# Patient Record
Sex: MALE | Race: WHITE | NOT HISPANIC OR LATINO
[De-identification: names, ages, dates, MRNs, and addresses within clinical notes are randomized per-mention and may not be internally consistent; named-entity substitution may affect disease eponyms.]

---

## 2019-05-03 ENCOUNTER — APPOINTMENT (OUTPATIENT)
Dept: INTERNAL MEDICINE | Facility: CLINIC | Age: 24
End: 2019-05-03
Payer: COMMERCIAL

## 2019-05-03 VITALS
TEMPERATURE: 98.2 F | WEIGHT: 165 LBS | BODY MASS INDEX: 26.52 KG/M2 | RESPIRATION RATE: 14 BRPM | OXYGEN SATURATION: 98 % | HEART RATE: 78 BPM | HEIGHT: 66 IN | SYSTOLIC BLOOD PRESSURE: 124 MMHG | DIASTOLIC BLOOD PRESSURE: 78 MMHG

## 2019-05-03 DIAGNOSIS — Z78.9 OTHER SPECIFIED HEALTH STATUS: ICD-10-CM

## 2019-05-03 DIAGNOSIS — F17.200 NICOTINE DEPENDENCE, UNSPECIFIED, UNCOMPLICATED: ICD-10-CM

## 2019-05-03 DIAGNOSIS — Z83.3 FAMILY HISTORY OF DIABETES MELLITUS: ICD-10-CM

## 2019-05-03 PROCEDURE — 36415 COLL VENOUS BLD VENIPUNCTURE: CPT

## 2019-05-03 PROCEDURE — 99204 OFFICE O/P NEW MOD 45 MIN: CPT | Mod: 25

## 2019-05-03 RX ORDER — ESCITALOPRAM OXALATE 5 MG/1
5 TABLET, FILM COATED ORAL DAILY
Refills: 0 | Status: ACTIVE | COMMUNITY

## 2019-05-03 NOTE — HISTORY OF PRESENT ILLNESS
[FreeTextEntry1] : Going to [facility] for ALcoholism \par mostly beer 9 per day\par no Cocaine or POT no Valium or Xanax \par sober 6 days \par no tremors \par got DUI

## 2019-05-03 NOTE — HEALTH RISK ASSESSMENT
[Good] : ~his/her~  mood as  good [No falls in past year] : Patient reported no falls in the past year [0] : 1) Little interest or pleasure doing things: Not at all (0) [1] : 2) Feeling down, depressed, or hopeless for several days (1) [] : No

## 2019-05-03 NOTE — PHYSICAL EXAM
[No Acute Distress] : no acute distress [Well-Appearing] : well-appearing [Well Developed] : well developed [Well Nourished] : well nourished [PERRL] : pupils equal round and reactive to light [Normal Sclera/Conjunctiva] : normal sclera/conjunctiva [Normal Voice/Communication] : normal voice/communication [Normal Oropharynx] : the oropharynx was normal [Normal Outer Ear/Nose] : the outer ears and nose were normal in appearance [EOMI] : extraocular movements intact [Supple] : supple [No JVD] : no jugular venous distention [Normal TMs] : both tympanic membranes were normal [Thyroid Normal, No Nodules] : the thyroid was normal and there were no nodules present [No Lymphadenopathy] : no lymphadenopathy [No Respiratory Distress] : no respiratory distress  [Clear to Auscultation] : lungs were clear to auscultation bilaterally [No Accessory Muscle Use] : no accessory muscle use [Normal Rate] : normal rate  [Regular Rhythm] : with a regular rhythm [Normal S1, S2] : normal S1 and S2 [No Murmur] : no murmur heard [No Abdominal Bruit] : a ~M bruit was not heard ~T in the abdomen [No Varicosities] : no varicosities [No Carotid Bruits] : no carotid bruits [No Extremity Clubbing/Cyanosis] : no extremity clubbing/cyanosis [No Edema] : there was no peripheral edema [Pedal Pulses Present] : the pedal pulses are present [No Palpable Aorta] : no palpable aorta [Non Tender] : non-tender [Soft] : abdomen soft [Non-distended] : non-distended [Normal Supraclavicular Nodes] : no supraclavicular lymphadenopathy [Normal Bowel Sounds] : normal bowel sounds [No Masses] : no abdominal mass palpated [No HSM] : no HSM [Normal Anterior Cervical Nodes] : no anterior cervical lymphadenopathy [Normal Posterior Cervical Nodes] : no posterior cervical lymphadenopathy [No Joint Swelling] : no joint swelling [No CVA Tenderness] : no CVA  tenderness [No Spinal Tenderness] : no spinal tenderness [No Rash] : no rash [Normal Gait] : normal gait [Grossly Normal Strength/Tone] : grossly normal strength/tone [Coordination Grossly Intact] : coordination grossly intact [No Focal Deficits] : no focal deficits [Deep Tendon Reflexes (DTR)] : deep tendon reflexes were 2+ and symmetric [Memory Grossly Normal] : memory grossly normal [Normal Affect] : the affect was normal [Alert and Oriented x3] : oriented to person, place, and time [Speech Grossly Normal] : speech grossly normal [Normal Insight/Judgement] : insight and judgment were intact [Normal Mood] : the mood was normal

## 2019-05-04 LAB
ALBUMIN SERPL ELPH-MCNC: 4.7 G/DL
ALP BLD-CCNC: 48 U/L
ALT SERPL-CCNC: 15 U/L
ANION GAP SERPL CALC-SCNC: 9 MMOL/L
AST SERPL-CCNC: 20 U/L
BASOPHILS # BLD AUTO: 0.04 K/UL
BASOPHILS NFR BLD AUTO: 0.7 %
BILIRUB SERPL-MCNC: 0.5 MG/DL
BUN SERPL-MCNC: 12 MG/DL
CALCIUM SERPL-MCNC: 10 MG/DL
CHLORIDE SERPL-SCNC: 99 MMOL/L
CHOLEST SERPL-MCNC: 196 MG/DL
CHOLEST/HDLC SERPL: 3.4 RATIO
CK SERPL-CCNC: 111 U/L
CO2 SERPL-SCNC: 31 MMOL/L
CREAT SERPL-MCNC: 0.92 MG/DL
EOSINOPHIL # BLD AUTO: 0.19 K/UL
EOSINOPHIL NFR BLD AUTO: 3.4 %
ESTIMATED AVERAGE GLUCOSE: 91 MG/DL
GLUCOSE SERPL-MCNC: 73 MG/DL
HBA1C MFR BLD HPLC: 4.8 %
HCT VFR BLD CALC: 47.1 %
HDLC SERPL-MCNC: 57 MG/DL
HGB BLD-MCNC: 15.4 G/DL
IMM GRANULOCYTES NFR BLD AUTO: 0.5 %
LDLC SERPL CALC-MCNC: 80 MG/DL
LYMPHOCYTES # BLD AUTO: 1.19 K/UL
LYMPHOCYTES NFR BLD AUTO: 21.3 %
MAN DIFF?: NORMAL
MCHC RBC-ENTMCNC: 32.3 PG
MCHC RBC-ENTMCNC: 32.7 GM/DL
MCV RBC AUTO: 98.7 FL
MONOCYTES # BLD AUTO: 0.52 K/UL
MONOCYTES NFR BLD AUTO: 9.3 %
NEUTROPHILS # BLD AUTO: 3.63 K/UL
NEUTROPHILS NFR BLD AUTO: 64.8 %
PLATELET # BLD AUTO: 254 K/UL
POTASSIUM SERPL-SCNC: 4.7 MMOL/L
PROT SERPL-MCNC: 6.8 G/DL
RBC # BLD: 4.77 M/UL
RBC # FLD: 11.7 %
SODIUM SERPL-SCNC: 139 MMOL/L
TRIGL SERPL-MCNC: 295 MG/DL
TSH SERPL-ACNC: 0.71 UIU/ML
WBC # FLD AUTO: 5.6 K/UL

## 2019-05-05 DIAGNOSIS — E55.9 VITAMIN D DEFICIENCY, UNSPECIFIED: ICD-10-CM

## 2019-05-06 PROBLEM — E55.9 VITAMIN D DEFICIENCY: Status: ACTIVE | Noted: 2019-05-06

## 2019-05-06 LAB — 25(OH)D3 SERPL-MCNC: 19 NG/ML

## 2019-05-06 RX ORDER — ADHESIVE TAPE 3"X 2.3 YD
50 MCG TAPE, NON-MEDICATED TOPICAL
Qty: 100 | Refills: 1 | Status: ACTIVE | COMMUNITY

## 2019-05-09 LAB
M TB IFN-G BLD-IMP: NEGATIVE
QUANTIFERON TB PLUS MITOGEN MINUS NIL: 9.53 IU/ML
QUANTIFERON TB PLUS NIL: 0.03 IU/ML
QUANTIFERON TB PLUS TB1 MINUS NIL: 0 IU/ML
QUANTIFERON TB PLUS TB2 MINUS NIL: 0 IU/ML

## 2020-05-27 ENCOUNTER — APPOINTMENT (OUTPATIENT)
Dept: INTERNAL MEDICINE | Facility: CLINIC | Age: 25
End: 2020-05-27
Payer: COMMERCIAL

## 2020-05-27 DIAGNOSIS — F10.10 ALCOHOL ABUSE, UNCOMPLICATED: ICD-10-CM

## 2020-05-27 DIAGNOSIS — F32.9 MAJOR DEPRESSIVE DISORDER, SINGLE EPISODE, UNSPECIFIED: ICD-10-CM

## 2020-05-27 DIAGNOSIS — F17.200 NICOTINE DEPENDENCE, UNSPECIFIED, UNCOMPLICATED: ICD-10-CM

## 2020-05-27 PROCEDURE — 99214 OFFICE O/P EST MOD 30 MIN: CPT | Mod: 95

## 2020-05-27 PROCEDURE — 99406 BEHAV CHNG SMOKING 3-10 MIN: CPT | Mod: 25,95

## 2020-05-27 RX ORDER — BUPROPION HYDROCHLORIDE 150 MG/1
150 TABLET, EXTENDED RELEASE ORAL DAILY
Qty: 30 | Refills: 1 | Status: ACTIVE | COMMUNITY

## 2020-05-27 NOTE — HISTORY OF PRESENT ILLNESS
[Home] : at home, [unfilled] , at the time of the visit. [Verbal consent obtained from patient] : the patient, [unfilled] [Medical Office: ([facility])___] : at the medical office located in  [FreeTextEntry1] : relapsed 2 weeks ago \par last drank 10 day ago drinking beer 3 per day 1 pint of Vodka\par no cocaine\par going back to [facility]

## 2020-05-27 NOTE — PHYSICAL EXAM
[No Acute Distress] : no acute distress [Normal Voice/Communication] : normal voice/communication [Memory Grossly Normal] : memory grossly normal [Speech Grossly Normal] : speech grossly normal [Alert and Oriented x3] : oriented to person, place, and time [Normal Affect] : the affect was normal [Normal Mood] : the mood was normal [Normal Insight/Judgement] : insight and judgment were intact

## 2020-05-27 NOTE — HEALTH RISK ASSESSMENT
[] : Yes [4 or more  times a week (4 pts)] : 4 or more  times a week (4 points) [Yes] : Yes [No] : In the past 12 months have you used drugs other than those required for medical reasons? No [7 to 9 (3 pts)] : 7 to 9 (3  points) [No falls in past year] : Patient reported no falls in the past year [0] : 2) Feeling down, depressed, or hopeless: Not at all (0) [de-identified] : 1/4 PPD

## 2020-05-27 NOTE — COUNSELING
[Risk of tobacco use and health benefits of smoking cessation discussed] : Risk of tobacco use and health benefits of smoking cessation discussed [Cessation strategies including cessation program discussed] : Cessation strategies including cessation program discussed [Willing to Quit Smoking] : Willing to quit smoking [Tobacco Use Cessation Intermediate Greater Than 3 Minutes Up to 10 Minutes] : Tobacco Use Cessation Intermediate Greater Than 3 Minutes Up to 10 Minutes [FreeTextEntry1] : 5

## 2020-05-27 NOTE — END OF VISIT
Patient calling, has been taking Prilosec OTC, pt states it is working great. Should she continue to take it? Please call patient at [phone].   [Time Spent: ___ minutes] : I have spent [unfilled] minutes of time on the encounter. [>50% of the face to face encounter time was spent on counseling and/or coordination of care for ___] : Greater than 50% of the face to face encounter time was spent on counseling and/or coordination of care for [unfilled]

## 2020-05-29 LAB
ALBUMIN SERPL ELPH-MCNC: 4.6 G/DL
ALP BLD-CCNC: 59 U/L
ALT SERPL-CCNC: 27 U/L
ANION GAP SERPL CALC-SCNC: 12 MMOL/L
AST SERPL-CCNC: 17 U/L
BASOPHILS # BLD AUTO: 0.03 K/UL
BASOPHILS NFR BLD AUTO: 0.6 %
BILIRUB SERPL-MCNC: 0.4 MG/DL
BUN SERPL-MCNC: 8 MG/DL
CALCIUM SERPL-MCNC: 9.9 MG/DL
CHLORIDE SERPL-SCNC: 100 MMOL/L
CO2 SERPL-SCNC: 27 MMOL/L
CREAT SERPL-MCNC: 1.03 MG/DL
EOSINOPHIL # BLD AUTO: 0.11 K/UL
EOSINOPHIL NFR BLD AUTO: 2.1 %
GLUCOSE SERPL-MCNC: 98 MG/DL
HCT VFR BLD CALC: 46 %
HGB BLD-MCNC: 15.5 G/DL
IMM GRANULOCYTES NFR BLD AUTO: 0.2 %
LYMPHOCYTES # BLD AUTO: 1.26 K/UL
LYMPHOCYTES NFR BLD AUTO: 23.7 %
MAN DIFF?: NORMAL
MCHC RBC-ENTMCNC: 31.5 PG
MCHC RBC-ENTMCNC: 33.7 GM/DL
MCV RBC AUTO: 93.5 FL
MONOCYTES # BLD AUTO: 0.76 K/UL
MONOCYTES NFR BLD AUTO: 14.3 %
NEUTROPHILS # BLD AUTO: 3.14 K/UL
NEUTROPHILS NFR BLD AUTO: 59.1 %
PLATELET # BLD AUTO: 236 K/UL
POTASSIUM SERPL-SCNC: 4.2 MMOL/L
PROT SERPL-MCNC: 6.7 G/DL
RBC # BLD: 4.92 M/UL
RBC # FLD: 12.4 %
SODIUM SERPL-SCNC: 140 MMOL/L
WBC # FLD AUTO: 5.31 K/UL

## 2020-05-31 LAB
M TB IFN-G BLD-IMP: NEGATIVE
QUANTIFERON TB PLUS MITOGEN MINUS NIL: 8.12 IU/ML
QUANTIFERON TB PLUS NIL: 0.01 IU/ML
QUANTIFERON TB PLUS TB1 MINUS NIL: 0 IU/ML
QUANTIFERON TB PLUS TB2 MINUS NIL: 0 IU/ML

## 2021-07-11 ENCOUNTER — EMERGENCY (EMERGENCY)
Facility: HOSPITAL | Age: 26
LOS: 1 days | Discharge: HOME | End: 2021-07-11
Attending: EMERGENCY MEDICINE | Admitting: EMERGENCY MEDICINE
Payer: SELF-PAY

## 2021-07-11 VITALS
OXYGEN SATURATION: 98 % | HEIGHT: 71 IN | DIASTOLIC BLOOD PRESSURE: 83 MMHG | HEART RATE: 100 BPM | TEMPERATURE: 98 F | SYSTOLIC BLOOD PRESSURE: 154 MMHG | RESPIRATION RATE: 20 BRPM | WEIGHT: 160.06 LBS

## 2021-07-11 PROCEDURE — 99281 EMR DPT VST MAYX REQ PHY/QHP: CPT

## 2021-07-11 PROCEDURE — 99284 EMERGENCY DEPT VISIT MOD MDM: CPT

## 2021-07-11 NOTE — ED PROVIDER NOTE - CONSTITUTIONAL, MLM
SUBJECTIVE:  Livia Granger is a 40 year old female who presents to the clinic today for a poison ivy.  Onset of rash was 10 day(s) ago.   Rash is sudden onset, still present and worsening.  Location of the rash: lower legs bilaterally, R antecubital fossa.  Quality/symptoms of rash: itching, burning and blistering   Symptoms are moderately severe and rash seems to be worsening.  Previous history of a similar rash? No  Recent exposure history: clearing brush.   has similar rash, though not as bad.    Associated symptoms include: nothing.    No past medical history on file.  Current Outpatient Medications   Medication Sig Dispense Refill     Ferrous Gluconate-C-Folic Acid (IRON-C PO)        QVAR REDIHALER 80 MCG/ACT inhaler        VENTOLIN  (90 Base) MCG/ACT inhaler        Social History     Tobacco Use     Smoking status: Never Smoker     Smokeless tobacco: Never Used   Substance Use Topics     Alcohol use: Not on file       ROS:  No fevers.    EXAM:   /78   Pulse 83   Temp 98  F (36.7  C) (Tympanic)   SpO2 97%   GENERAL: alert, no acute distress.  SKIN: Rash description:    Distribution: localized  Location: lower legs bilaterally with numerous blisters filled with straw-colored fluid    R forearm with a 3 cm patch of erythema and small blisters    ASSESSMENT:  Poison ivy    PLAN:  Prednisone taper.  Follow up if not noticeably better in 1 week.    Patient Instructions   Take 3 tablets of prednisone daily for 3 days, then    Take 2 tablets of prednisone daily for 3 days, then    Take 1 tablet of prednisone daily for 4 days, then    Take 1/2 tablet of prednisone daily for 4 days.    Then celebrate being done! :)         normal... Well appearing, awake, alert, oriented to person, place, time/situation and in no apparent distress.

## 2021-07-11 NOTE — ED PROVIDER NOTE - NSFOLLOWUPINSTRUCTIONS_ED_ALL_ED_FT
Please call [phone] for assistance in reducing your drinking      Alcohol Use Disorder    AMBULATORY CARE:    Alcohol use disorder (AUD) is problem drinking. AUD includes alcohol abuse and alcohol dependence. Alcohol can damage your brain, heart, kidneys, lungs, and liver. Your risk of stroke is greater if you have 5 or more drinks each day. If you are pregnant, you and your baby are at risk for serious health problems. No amount of alcohol is safe during pregnancy.    Signs and symptoms of AUD: Your healthcare provider will ask you about your alcohol use. Mild AUD means you had 2 to 3 of the following over the last 12 months. Moderate means you have 4 to 5. Severe means you have 6 or more.  •You have tried to decrease or stop drinking more than one time. You are not able to control your drinking habits. You keep going back to drinking even after you quit. You have had more to drink than you planned, or you drank for a longer time than you wanted.      •You crave alcohol. You have a desire to drink more often or to drink larger amounts of alcohol. You have a hard time thinking about anything other than alcohol.      •You put extra effort and time into drinking alcohol. You often go to events or activities that will include drinking. You spend much of your time drinking alcohol or being with people who also drink.      •You spend less time doing more important things. You have trouble taking part in social or daily activities at school, work, or home.      •You have given up activities that you like. You would rather drink. You also spend a lot of time recovering from the effects of drinking.      •You continue to drink even when it causes health or relationship problems. You may have problems with your family, friends, or coworkers but still want to drink. Health problems include liver problems, stomach ulcers, high blood pressure, or a stroke.      •You develop alcohol tolerance. Tolerance means the amount of alcohol you usually drink no longer causes the effects you desire. You need to drink even more alcohol to get the same effects.      •You have withdrawal (physical or mental) symptoms after not drinking for a short period. Alcohol is needed to relieve or prevent withdrawal symptoms such as tremors (shakes). You may also have to drink to stop withdrawal symptoms or to cure a hangover.      •You put yourself in physically dangerous settings while drinking. Examples include driving a car or having unprotected sex after drinking.      Call your local emergency number (216 in the [place]) if:   •You have a seizure.          Call your doctor if:   •Your heart is beating faster than usual.      •You have hallucinations.      •You cannot remember what happens while you are drinking.      •You are anxious and have nausea.      •Your hands are shaky and you are sweating heavily.      •You have questions or concerns about your condition or care.      Treatment: Your healthcare provider may admit you to the hospital to help you withdraw from alcohol safely. Then you may need any of the following:  •Medicines to decrease your craving for alcohol      •Support groups such as Alcoholics Anonymous       •Therapy services from a psychiatrist or psychologist       •Admission to an inpatient facility for treatment for severe dependence      Follow up with your healthcare provider as directed: Write down your questions so you remember to ask them during your visits.    For support and more information:   •Substance Abuse and Mental Health Services Administration  [address]  [place] [address]  Web Address: http://www.[facility].gov      •Alcoholics Anonymous  Web Address: http://www.aa.org  ***    Wound Closure Removal, Care After      This sheet gives you information about how to care for yourself after your stitches (sutures), staples, or skin adhesives have been removed. Your health care provider may also give you more specific instructions. If you have problems or questions, contact your health care provider.      What can I expect after the procedure?  After your sutures or staples have been removed or your skin adhesives have fallen off, it is common to have:  •Some discomfort and swelling in the area.      •Slight redness in the area.        Follow these instructions at home:    If you have a bandage:     •Wash your hands with soap and water before you change your bandage (dressing). If soap and water are not available, use hand .      •Change your dressing as told by your health care provider. If your dressing becomes wet or dirty, or develops a bad smell, change it as soon as possible.       •If your dressing sticks to your skin, pour warm, clean water over it until it loosens and can be removed without pulling apart the wound edges. Pat the area dry with a soft, clean towel. Do not rub the wound because that may cause bleeding.        Wound care      •Keep the wound area dry and clean.    •Check your wound every day for signs of infection. Check for:  •Redness, swelling, or pain.      •Fluid or blood.       •Warmth.       •Pus or a bad smell.        •Wash your hands with soap and water before and after touching your wound.      •Apply cream or ointment only as told by your health care provider. If you are using cream or ointment, wash the area with soap and water 2 times a day to remove all the cream or ointment. Rinse off the soap and pat the area dry with a clean towel.      •If skin glue or adhesive strips were applied after sutures or staples were removed, leave these closures in place until they peel off on their own. If adhesive strip edges start to loosen and curl up, you may trim the loose edges. Do not remove adhesive strips completely unless your health care provider tells you to do that.      •Continue to protect the wound from injury.       • Do not pick at your wound. Picking can cause an infection.      Bathing     • Do not take baths, swim, or use a hot tub until your health care provider approves.      •Ask your health care provider when it is okay to shower.    •Follow these steps for showering:  •If you have a dressing, remove it before getting into the shower.      •In the shower, allow soapy water to get on the wound. Avoid scrubbing the wound.      •When you get out of the shower, dry the wound by patting it with a clean towel.      •Reapply a dressing over the wound if needed.        Scar care   •When your wound has completely healed, take actions to help decrease the size of your scar:  •Wear sunscreen over the scar or cover it with clothing when you are outside. New scars get sunburned easily, which can make scarring worse.      •Gently massage the scarred area. This can decrease scar thickness.        General instructions     •Take over-the-counter and prescription medicines only as told by your health care provider.       •Keep all follow-up visits as told by your health care provider. This is important.        Contact a health care provider if:    •You have redness, swelling, or pain around your wound.       •You have fluid or blood coming from your wound.       •Your wound feels warm to the touch.       •You have pus or a bad smell coming from your wound.       •Your wound opens up.      •You have chills.        Get help right away if:    •You have a fever.      •You have redness that is spreading from your wound.        Summary    •Change your dressing as told by your health care provider. If your dressing becomes wet or dirty, or develops a bad smell, change it as soon as possible.      •Check your wound every day for signs of infection.      •Wash your hands with soap and water before and after touching your wound.      This information is not intended to replace advice given to you by your health care provider. Make sure you discuss any questions you have with your health care provider.
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
## 2021-07-11 NOTE — ED PROVIDER NOTE - PATIENT PORTAL LINK FT
You can access the FollowMyHealth Patient Portal offered by [facility] by registering at the following website: [email or web address]. By joining [employer]’s FollowMyHealth portal, you will also be able to view your health information using other applications (apps) compatible with our system.

## 2021-07-11 NOTE — ED PROVIDER NOTE - NSFOLLOWUPCLINICS_GEN_ALL_ED_FT
Family Practice Clinic  Family Medicine  [address] [place] [id number]  Phone: [phone]  Fax:

## 2024-04-05 ENCOUNTER — OUTPATIENT (OUTPATIENT)
Dept: OUTPATIENT SERVICES | Facility: HOSPITAL | Age: 29
LOS: 1 days | End: 2024-04-05
Payer: COMMERCIAL

## 2024-04-05 DIAGNOSIS — Z00.00 ENCOUNTER FOR GENERAL ADULT MEDICAL EXAMINATION WITHOUT ABNORMAL FINDINGS: ICD-10-CM

## 2024-04-05 DIAGNOSIS — H52.10 MYOPIA, UNSPECIFIED EYE: ICD-10-CM

## 2024-04-05 PROBLEM — F10.10 ALCOHOL ABUSE, UNCOMPLICATED: Chronic | Status: ACTIVE | Noted: 2021-07-11

## 2024-04-05 PROCEDURE — G0463: CPT

## 2025-03-17 ENCOUNTER — EMERGENCY (EMERGENCY)
Facility: HOSPITAL | Age: 30
LOS: 1 days | Discharge: HOME | End: 2025-03-17
Attending: EMERGENCY MEDICINE | Admitting: EMERGENCY MEDICINE
Payer: COMMERCIAL

## 2025-03-17 VITALS
HEART RATE: 99 BPM | TEMPERATURE: 98 F | DIASTOLIC BLOOD PRESSURE: 93 MMHG | WEIGHT: 169.98 LBS | SYSTOLIC BLOOD PRESSURE: 134 MMHG | HEIGHT: 71 IN | OXYGEN SATURATION: 100 % | RESPIRATION RATE: 16 BRPM

## 2025-03-17 PROCEDURE — 99285 EMERGENCY DEPT VISIT HI MDM: CPT

## 2025-03-17 PROCEDURE — 99284 EMERGENCY DEPT VISIT MOD MDM: CPT

## 2025-03-17 NOTE — ED ADULT NURSE NOTE - CHIEF COMPLAINT QUOTE
Pt states he wants to detox from alcohol addiction.  Pt drinks vodka 1pints-1/2 gallon a day.  Pt has been to rehab before.  Pt had seizures in the past.  Last drink 830a beer. Pt states he wants to detox from alcohol addiction.  Pt drinks vodka 1pints-1/2 gallon a day.  Pt has been to rehab before.  Pt had seizures in the past.  Last drink 830a beer.  alcohol withdrawal
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
## 2025-03-17 NOTE — ED ADULT TRIAGE NOTE - CHIEF COMPLAINT QUOTE
Pt states he wants to detox from alcohol addiction.  Pt drinks vodka 1pints-1/2 gallon a day.  Pt has been to rehab before.  Pt had seizures in the past.  Last drink 830a beer.

## 2025-03-17 NOTE — ED PROVIDER NOTE - PATIENT PORTAL LINK FT
You can access the FollowMyHealth Patient Portal offered by [facility] by registering at the following website: [email or web address]. By joining [employer]’s FollowMyHealth portal, you will also be able to view your health information using other applications (apps) compatible with our system.

## 2025-03-17 NOTE — ED ADULT NURSE NOTE - OBJECTIVE STATEMENT
pt axo3, seeking assistance in detox. pt states last drink was today at 0830 am.  Pt drinks vodka 1pints-1/2 gallon a day. no tremors present, gait steady. does not endorse headache, delusions/hallucinations, anxiety, nausea, vomiting, or tactile/auditory disturbances. pt became upset with plan of care (labs/fluids/SW consult) and stated "I do not want to wait. this is not helping me." pt re-evaluated by MD and was discharged. safety maintained.

## 2025-03-17 NOTE — ED ADULT NURSE NOTE - NSFALLUNIVINTERV_ED_ALL_ED
Bed/Stretcher in lowest position, wheels locked, appropriate side rails in place/Call bell, personal items and telephone in reach/Instruct patient to call for assistance before getting out of bed/chair/stretcher/Non-slip footwear applied when patient is off stretcher/Globe to call system/Physically safe environment - no spills, clutter or unnecessary equipment/Purposeful proactive rounding/Room/bathroom lighting operational, light cord in reach

## 2025-03-17 NOTE — ED PROVIDER NOTE - NSFOLLOWUPINSTRUCTIONS_ED_ALL_ED_FT
Alcohol Use Disorder  Alcohol use disorder is a condition in which drinking disrupts daily life. People with this condition drink too much alcohol and cannot control their drinking.    Alcohol use disorder can cause serious problems with physical health. It can affect the brain, heart, and other internal organs. This disorder can raise the risk for certain cancers and cause problems with mental health, such as depression or anxiety.    What are the causes?  This condition is caused by drinking too much alcohol over time. Some people with this condition drink to cope with or escape from negative life events. Others drink to relieve symptoms of physical pain or symptoms of mental illness.    What increases the risk?  You are more likely to develop this condition if:  You have a family history of alcohol use disorder.  Your culture encourages drinking to the point of becoming drunk (intoxication).  You had a mood or conduct disorder in childhood.  You have been abused.  You are an adolescent and you:  Have poor performance in school.  Have poor supervision or guidance.  Act on impulse and like taking risks.  What are the signs or symptoms?  Symptoms of this condition include:  Drinking more than you want to.  Trying several times without success to drink less.  Spending a lot of time thinking about alcohol, getting alcohol, drinking alcohol, or recovering from drinking alcohol.  Continuing to drink even when it is causing serious problems in your daily life.  Drinking when it is dangerous to drink, such as before driving a car.  Needing more and more alcohol to get the same effect you want (building up tolerance).  Having symptoms of withdrawal when you stop drinking. Withdrawal symptoms may include:  Trouble sleeping, leading to tiredness (fatigue).  Mood swings of depression and anxiety.  Physical symptoms, such as a fast heart rate, rapid breathing, high blood pressure (hypertension), fever, cold sweats, or nausea.  Seizures.  Severe confusion.  Feeling or seeing things that are not there (hallucinations).  Shaking movements that you cannot control (tremors).  How is this diagnosed?  This condition is diagnosed with an assessment. Your health care provider may start by asking three or four questions about your drinking, or they may give you a simple test to take. This helps to get clear information from you.    You may also have a physical exam or lab tests. You may be referred to a substance abuse counselor.    How is this treated?  Two people talking with a counselor.  With education, some people with alcohol use disorder are able to reduce their drinking. Many with this disorder cannot change their drinking behavior on their own and need help with treatment from substance use specialists. Treatments may include:  Detoxification. Detoxification involves quitting drinking with supervision and direction of health care providers. Your health care provider may prescribe medicines within the first week to help lessen withdrawal symptoms. Alcohol withdrawal can be dangerous and life-threatening. Detoxification may be provided in a home, community, or primary care setting, or in a hospital or substance use treatment facility.  Counseling. This may involve motivational interviewing (MI), family therapy, or cognitive behavioral therapy (CBT). A counselor can address the things you can do to change your drinking behavior and how to maintain the changes. Talk therapy aims to:  Identify your positive motivations to change.  Identify and avoid the things that trigger your drinking.  Help you learn how to plan your behavior change.  Develop support systems that can help you sustain the change.  Medicines. Medicines can help treat this disorder by:  Decreasing cravings.  Decreasing the positive feeling you have when you drink.  Causing an uncomfortable physical reaction when you drink (aversion therapy).  Support groups such as Alcoholics Anonymous (AA). These groups are led by people who have quit drinking. The groups provide emotional support, advice, and guidance.  Some people with this condition benefit from a combination of treatments provided by specialized substance use treatment centers.    Follow these instructions at home:  A sign telling a person not to drink beer, wine, or hard liquor.   Medicines    Take over-the-counter and prescription medicines only as told by your health care provider.  Ask before starting any new medicines, herbs, or supplements.  General instructions    Ask friends and family members to support your choice to stay sober.  Avoid places where alcohol is served.  Create a plan to deal with tempting situations.  Attend support groups regularly.  Practice hobbies or activities you enjoy.  Do not drink and drive.  How is this prevented?  Do not drink alcohol if your health care provider tells you not to drink.  If you drink alcohol:  Limit how much you have to:  0–1 drink a day for women who are not pregnant.  0–2 drinks a day for men.  Know how much alcohol is in your drink. In the [place] one drink equals one 12 oz bottle of beer (355 mL), one 5 oz glass of wine (148 mL), or one 1½ oz glass of hard liquor (44 mL).  If you have a mental health condition, seek treatment.  Develop a healthy lifestyle through:  Meditation or deep breathing.  Exercise.  Spending time in nature.  Listening to music.  Talking with a trusted friend or family member.  If you are a teen:  Do not drink alcohol. Avoid gatherings where you might be tempted to drink alcohol.  Do not be afraid to say no if someone offers you alcohol. Speak up about why you do not want to drink. Set a positive example for others around you by not drinking.  Build relationships with friends who do not drink.  Where to find more information  Substance Abuse and Mental Health Services Administration: samhsa.gov  Alcoholics Anonymous: aa.org  Contact a health care provider if:  You cannot take your medicines as told.  Your symptoms get worse or you experience symptoms of withdrawal when you stop drinking.  You start drinking again (relapse) and your symptoms get worse.  Get help right away if:  You have thoughts about hurting yourself or others.  Get help right away if you feel like you may hurt yourself or others, or have thoughts about taking your own life. Go to your nearest emergency room or:  Call 911.  Call the National Suicide Prevention Lifeline at 1-958.633.2390 or 175. This is open 24 hours a day.  Text the Crisis Text Line at 748464.  Summary  Alcohol use disorder is a condition in which drinking disrupts daily life. People with this condition drink too much alcohol and cannot control their drinking.  Treatment may include detoxification, counseling, medicines, and support groups.  Ask friends and family members to support you. Avoid situations where alcohol is served.  Get help right away if you have thoughts about hurting yourself or others.  This information is not intended to replace advice given to you by your health care provider. Make sure you discuss any questions you have with your health care provider.

## 2025-03-17 NOTE — ED PROVIDER NOTE - PHYSICAL EXAMINATION
Vitals: I have reviewed the patients vital signs  General: nontoxic appearing, No tremulousness  HEENT: Atraumatic, normocephalic, airway patent  Eyes: EOMI, tracking appropriately  Neck: no tracheal deviation  Chest/Lungs: no trauma, symmetric chest rise, speaking in complete sentences,  no resp distress, clear lungs.   Heart: skin and extremities well perfused, regular rate and rhythm  Neuro: A+Ox3, ambulating without difficulty, appears non focal, Speech clear, gait normal and steady.  MSK: strength at baseline in all extremities, no muscle wasting or atrophy  Skin: no cyanosis, no jaundice
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
## 2025-04-17 NOTE — ED PROCEDURE NOTE - CPROC ED TIME OUT STATEMENT1
Detail Level: Zone
“Patient's name, , procedure and correct site were confirmed during the [place] Timeout.”